# Patient Record
Sex: FEMALE | Race: WHITE | NOT HISPANIC OR LATINO
[De-identification: names, ages, dates, MRNs, and addresses within clinical notes are randomized per-mention and may not be internally consistent; named-entity substitution may affect disease eponyms.]

---

## 2019-08-23 ENCOUNTER — APPOINTMENT (OUTPATIENT)
Dept: GERIATRICS | Facility: CLINIC | Age: 84
End: 2019-08-23
Payer: MEDICARE

## 2019-08-23 VITALS
HEIGHT: 58 IN | DIASTOLIC BLOOD PRESSURE: 80 MMHG | RESPIRATION RATE: 17 BRPM | SYSTOLIC BLOOD PRESSURE: 118 MMHG | HEART RATE: 69 BPM | WEIGHT: 157.2 LBS | BODY MASS INDEX: 33 KG/M2 | TEMPERATURE: 98.5 F | OXYGEN SATURATION: 95 %

## 2019-08-23 DIAGNOSIS — I05.9 RHEUMATIC MITRAL VALVE DISEASE, UNSPECIFIED: ICD-10-CM

## 2019-08-23 DIAGNOSIS — I07.9 RHEUMATIC TRICUSPID VALVE DISEASE, UNSPECIFIED: ICD-10-CM

## 2019-08-23 DIAGNOSIS — R60.9 EDEMA, UNSPECIFIED: ICD-10-CM

## 2019-08-23 DIAGNOSIS — Z87.440 PERSONAL HISTORY OF URINARY (TRACT) INFECTIONS: ICD-10-CM

## 2019-08-23 DIAGNOSIS — R91.8 OTHER NONSPECIFIC ABNORMAL FINDING OF LUNG FIELD: ICD-10-CM

## 2019-08-23 DIAGNOSIS — Z71.89 OTHER SPECIFIED COUNSELING: ICD-10-CM

## 2019-08-23 DIAGNOSIS — I10 ESSENTIAL (PRIMARY) HYPERTENSION: ICD-10-CM

## 2019-08-23 DIAGNOSIS — M10.9 GOUT, UNSPECIFIED: ICD-10-CM

## 2019-08-23 DIAGNOSIS — F41.9 ANXIETY DISORDER, UNSPECIFIED: ICD-10-CM

## 2019-08-23 DIAGNOSIS — R07.9 CHEST PAIN, UNSPECIFIED: ICD-10-CM

## 2019-08-23 DIAGNOSIS — M79.2 NEURALGIA AND NEURITIS, UNSPECIFIED: ICD-10-CM

## 2019-08-23 DIAGNOSIS — E78.5 HYPERLIPIDEMIA, UNSPECIFIED: ICD-10-CM

## 2019-08-23 PROCEDURE — 99205 OFFICE O/P NEW HI 60 MIN: CPT | Mod: GC

## 2019-08-23 RX ORDER — ELECTROLYTES/DEXTROSE
SOLUTION, ORAL ORAL DAILY
Refills: 0 | Status: ACTIVE | COMMUNITY
Start: 2019-08-23

## 2019-08-23 RX ORDER — HYDROCHLOROTHIAZIDE 25 MG/1
25 TABLET ORAL DAILY
Refills: 0 | Status: ACTIVE | COMMUNITY
Start: 2019-08-23

## 2019-08-23 RX ORDER — ROSUVASTATIN CALCIUM 10 MG/1
10 TABLET, FILM COATED ORAL DAILY
Refills: 0 | Status: ACTIVE | COMMUNITY
Start: 2019-08-23

## 2019-08-23 RX ORDER — GABAPENTIN 100 MG/1
100 CAPSULE ORAL 3 TIMES DAILY
Qty: 90 | Refills: 3 | Status: ACTIVE | COMMUNITY
Start: 2019-08-23

## 2019-08-23 RX ORDER — OMEPRAZOLE 40 MG/1
40 CAPSULE, DELAYED RELEASE ORAL DAILY
Refills: 0 | Status: ACTIVE | COMMUNITY
Start: 2019-08-23

## 2019-08-23 RX ORDER — ALLOPURINOL 100 MG/1
100 TABLET ORAL DAILY
Refills: 0 | Status: ACTIVE | COMMUNITY
Start: 2019-08-23

## 2019-08-23 NOTE — PHYSICAL EXAM
[General Appearance - Alert] : alert [General Appearance - In No Acute Distress] : in no acute distress [General Appearance - Well Nourished] : well nourished [General Appearance - Well Developed] : well developed [PERRL With Normal Accommodation] : pupils were equal in size, round, and reactive to light [Sclera] : the sclera and conjunctiva were normal [Extraocular Movements] : extraocular movements were intact [Normal Oral Mucosa] : normal oral mucosa [Neck Appearance] : the appearance of the neck was normal [Jugular Venous Distention Increased] : there was no jugular-venous distention [Apical Impulse] : the apical impulse was normal [Heart Rate And Rhythm] : heart rate was normal and rhythm regular [Heart Sounds] : normal S1 and S2 [Abdomen Soft] : soft [Bowel Sounds] : normal bowel sounds [Abdomen Tenderness] : non-tender [No CVA Tenderness] : no ~M costovertebral angle tenderness [No Spinal Tenderness] : no spinal tenderness [Abnormal Walk] : normal gait [Musculoskeletal - Swelling] : no joint swelling seen [Nail Clubbing] : no clubbing  or cyanosis of the fingernails [Skin Color & Pigmentation] : normal skin color and pigmentation [Skin Turgor] : normal skin turgor [Deep Tendon Reflexes (DTR)] : deep tendon reflexes were 2+ and symmetric [] : no rash [Cranial Nerves] : cranial nerves 2-12 were intact [Oriented To Time, Place, And Person] : oriented to person, place, and time [Affect] : the affect was normal [Mood] : the mood was normal [FreeTextEntry1] : scoliosis

## 2019-08-23 NOTE — ASSESSMENT
[FreeTextEntry1] : Pt had recent blood work done, will not repeat\par f/u visit in 3 month and will plan for Influenza shot on next visit.

## 2019-08-23 NOTE — REVIEW OF SYSTEMS
[Limb Pain] : limb pain [Negative] : Psychiatric [FreeTextEntry8] : vaginal discomfort  [FreeTextEntry9] : left leg pain and right shoulder pain

## 2019-08-23 NOTE — END OF VISIT
[] : Fellow [FreeTextEntry3] : 89yo woman who is brought by her daughter Sailaja Haas ( [phone]) for primary care. Patient resides at the [facility] in [place]  for the past 4 years and is stable. PMH: Breast, lung cancer (f/u in [facility]), osteoarthritis, dextroscoliosis, gallstones, cystocele, CLL and glaucoma. Major complain left leg since August 3rd, no trauma, worse upon ambulation. She also has recurrent UTIs, is now completing a course of Cipro.. Pt has also noted vaginal discomfort over the past day, reported has not seen a GYN for the past 5 years. \par Was previously on medical cannabis for pain, but discontinued 2 weeks as she did not notice any benefits. Pt was seen by a Physiatrist Dr. Emerson in [facility] was started on acupuncture, medical cannabis and tramadol (but never took it). Pt had recent MRI showing dextroscoliosis and is scheduled for epidural injection of her back. \par PE: Marked kyphoscoliosis, poor lung expansion, heart RSR, gait very unsteady, difficulty getting up from chair. \par Plan: refer to urogynecology from comprehensive gyn exam , r/o vaginal atrophy. Elective injection L4-L5 .\par \par Patient does not drive. Pt sets up a pill box to keep track of medication daily.

## 2019-08-29 ENCOUNTER — TRANSCRIPTION ENCOUNTER (OUTPATIENT)
Age: 84
End: 2019-08-29

## 2019-08-29 ENCOUNTER — APPOINTMENT (OUTPATIENT)
Dept: UROGYNECOLOGY | Facility: CLINIC | Age: 84
End: 2019-08-29
Payer: MEDICARE

## 2019-08-29 VITALS
HEART RATE: 87 BPM | DIASTOLIC BLOOD PRESSURE: 81 MMHG | SYSTOLIC BLOOD PRESSURE: 133 MMHG | BODY MASS INDEX: 32.95 KG/M2 | WEIGHT: 157 LBS | HEIGHT: 58 IN

## 2019-08-29 DIAGNOSIS — Z83.511 FAMILY HISTORY OF GLAUCOMA: ICD-10-CM

## 2019-08-29 DIAGNOSIS — N39.0 URINARY TRACT INFECTION, SITE NOT SPECIFIED: ICD-10-CM

## 2019-08-29 DIAGNOSIS — Z80.0 FAMILY HISTORY OF MALIGNANT NEOPLASM OF DIGESTIVE ORGANS: ICD-10-CM

## 2019-08-29 DIAGNOSIS — Z86.69 PERSONAL HISTORY OF OTHER DISEASES OF THE NERVOUS SYSTEM AND SENSE ORGANS: ICD-10-CM

## 2019-08-29 DIAGNOSIS — C91.90 LYMPHOID LEUKEMIA, UNSPECIFIED NOT HAVING ACHIEVED REMISSION: ICD-10-CM

## 2019-08-29 DIAGNOSIS — Z81.8 FAMILY HISTORY OF OTHER MENTAL AND BEHAVIORAL DISORDERS: ICD-10-CM

## 2019-08-29 DIAGNOSIS — N95.2 POSTMENOPAUSAL ATROPHIC VAGINITIS: ICD-10-CM

## 2019-08-29 LAB
BILIRUB UR QL STRIP: NORMAL
CLARITY UR: CLEAR
COLLECTION METHOD: NORMAL
GLUCOSE UR-MCNC: NORMAL
HCG UR QL: 0.2 EU/DL
HGB UR QL STRIP.AUTO: NORMAL
KETONES UR-MCNC: NORMAL
LEUKOCYTE ESTERASE UR QL STRIP: NORMAL
NITRITE UR QL STRIP: NORMAL
PH UR STRIP: 7.5
PROT UR STRIP-MCNC: NORMAL
SP GR UR STRIP: 1.01

## 2019-08-29 PROCEDURE — 81003 URINALYSIS AUTO W/O SCOPE: CPT | Mod: NC,QW

## 2019-08-29 PROCEDURE — 99204 OFFICE O/P NEW MOD 45 MIN: CPT

## 2019-08-29 RX ORDER — LATANOPROST/PF 0.005 %
DROPS OPHTHALMIC (EYE)
Refills: 0 | Status: ACTIVE | COMMUNITY

## 2019-08-29 RX ORDER — PILOCARPINE HYDROCHLORIDE 7.5 MG/1
TABLET, FILM COATED ORAL
Refills: 0 | Status: ACTIVE | COMMUNITY

## 2019-08-29 NOTE — HISTORY OF PRESENT ILLNESS
[Cystocele (Obstetric)] : none [Rectal Prolapse] : none [Vaginal Wall Prolapse] : none [Feelings Of Urinary Urgency] : none [Urinary Frequency] : none [x1] : once nightly [Urinary Tract Infection] : none [Constipation Obstructed Defecation] : frequent [] : years ago [Pelvic Pain] : none [Vaginal Pain] : none [Unable To Restrain Bowel Movement] : frequent [de-identified] : Had an episode in 8/24/19 where she could not urinate for several hours. She went to ER and once she got there she was able to urinate without difficulty. Family attributes it to anxiety.  [de-identified] : not sexually active [FreeTextEntry6] : taking prunes and magnesium supplement [FreeTextEntry1] : \par 89yo presents with her daughter for frequent UTI's starting in June. She went to urgent care center June, July and August and is currently being treated for UTI with Ciprofloxacin. She describes her symptoms as dizziness, lightheadedness and her daughter reports she also experiences altered mental status. She denies dysuria, urgency, frequency or hematuria.  She reports AISHA, but denies urgency or frequency. She currently resides in an assisted living facility and has been there for the past 4 years.  She is awaked by assisted living staff at night to use the bathroom at least once at night though she denies feeling urgency to go. \par \par PMH: h/p breast cancer (2000 and 2018) - breast cancer from 2000 had radiation and chemotherapy, h/o lung cancer, HTN, glaucoma, CLL\par PSH: left mastectomy (2000), right mastectomy (2016), lung resection, abdominal supracervical hysterectomy \par All: Sulfa drugs \par

## 2019-08-29 NOTE — DISCUSSION/SUMMARY
[FreeTextEntry1] : Patient counseling occurred with daughter present. We discussed her reports of having UTI's and reviewed the symptoms that are specific for UTI's. She will need to fax records from urgent care center to our office. She was counseled on ways to prevent UTI's including vaginal estrogen, hydration, and cranberry supplements. Atrophy is a significant finding on the patient exam.  Options for symptomatic relief were reviewed.  The risks, benefits, the relationship and date relating to estrogens and breast cancer were reviewed. Secondary to patient's history of breast cancer she would prefer not to use vaginal estrogen. She was counseled that if she experiences dysuria, urgency or frequency, to come to our office to have a catheterized specimen as it is unlikely that she is able to give a clean catch if she were to go to an urgent care center. Should she go to an urgent care center, she will need to request a catheterized specimen. All questions and concerns were answered. She will follow up in 2 months. \par

## 2019-08-29 NOTE — PHYSICAL EXAM
[Well developed] : well developed [No Acute Distress] : in no acute distress [Well Nourished] : ~L well nourished [Bulbocavernous] : bulbocavernous was present [Oriented x3] : oriented to person, place, and time [Anal Reflex] : the anal reflex was present [Warm and Dry] : was warm and dry to touch [Labia Majora] : were normal [Normal Appearance] : general appearance was normal [Atrophy] : atrophy [Absent] : absent [Post Void Residual ____ml] : post void residual via catheterization was [unfilled] ml [Normal rectal exam] : was normal [Vulvar Atrophy] : vulvar atrophy [Normal] : normal [Anxiety] : patient is not anxious [Tenderness] : ~T no ~M abdominal tenderness observed [Distended] : not distended [H/Smegaly] : no hepatosplenomegaly [Inguinal LAD] : no adenopathy was noted in the inguinal lymph nodes [Normal Gait] : gait was abnormal [Uterine Adnexae] : were not tender and not enlarged [de-identified] : No prolapse

## 2019-08-29 NOTE — OB HISTORY
[Total Preg ___] : : [unfilled] [Full Term ___] : [unfilled] (full-term) [Vaginal ___] : [unfilled] vaginal delivery(s) [Last Pap Smear ___] : date of last pap smear was on [unfilled] [Abnormal Pap Smear] : normal pap smear [Sexually Active] : is not sexually active

## 2019-10-02 ENCOUNTER — APPOINTMENT (OUTPATIENT)
Dept: UROGYNECOLOGY | Facility: CLINIC | Age: 84
End: 2019-10-02
Payer: MEDICARE

## 2019-10-02 DIAGNOSIS — R39.11 HESITANCY OF MICTURITION: ICD-10-CM

## 2019-10-02 LAB
BILIRUB UR QL STRIP: NORMAL
CLARITY UR: CLEAR
COLLECTION METHOD: NORMAL
GLUCOSE UR-MCNC: NORMAL
HCG UR QL: 0.2 EU/DL
HGB UR QL STRIP.AUTO: NORMAL
KETONES UR-MCNC: NORMAL
LEUKOCYTE ESTERASE UR QL STRIP: NORMAL
NITRITE UR QL STRIP: NORMAL
PH UR STRIP: 7
PROT UR STRIP-MCNC: NORMAL
SP GR UR STRIP: 1.01

## 2019-10-02 PROCEDURE — 81003 URINALYSIS AUTO W/O SCOPE: CPT | Mod: NC,GC,QW

## 2019-10-02 PROCEDURE — 99213 OFFICE O/P EST LOW 20 MIN: CPT | Mod: GC

## 2019-10-02 NOTE — HISTORY OF PRESENT ILLNESS
[Feelings Of Urinary Urgency] : none [Hematuria] : none [Urinary Frequency] : daily [Urinary Tract Infection] : none [de-identified] : Dribbling  [FreeTextEntry1] : \par Patient presented as a walk-in due to difficulty voiding. Upon arrival, patient states she was able to void. She has a history of chronic lower back pain, s/p 2 epidurals within the last few weeks. Denies current dysuria, hematuria, urgency, frequency. Patient states that at her visit with pain management on Friday, she was asked if she had any trouble urinating or defecating. Since then she feels like she is only "dribbling" when voiding and has the sensation of incomplete emptying. Patient states she does not think it is a UTI, but was not able to verbalize what symptoms she usually has with a UTI. \par \par Drinks 2.5 bottles of water.

## 2019-10-02 NOTE — DISCUSSION/SUMMARY
[FreeTextEntry1] : \par 89yo with anxiety presented for urinary retention, now able to void normally. Discussed with patient the effect of anxiety on pelvic floor dysfunction. Recommended relaxation techniques, increasing fluid intake, lean forward, turning on water to trigger urination. Previously declined vaginal estrogen due to personal history of breast cancer. \par \par Recent lumbar MRI (for chronic back pain) showed bilateral renal cysts. Reassurance provided. \par \par Urine dip today showed small blood. Will send for UA C&S \par \par RTO in 1 month.

## 2019-10-02 NOTE — PHYSICAL EXAM
[Well developed] : well developed [Well Nourished] : ~L well nourished [Oriented x3] : oriented to person, place, and time [Normal Memory] : ~T memory was ~L unimpaired [Normal Mood/Affect] : mood and affect are normal [Anxiety] : patient is anxious [Normal Appearance] : ~T the appearance of the neck was normal [Warm and Dry] : was warm and dry to touch [Normal Gait] : gait was normal [Labia Majora] : were normal [Labia Minora] : were normal [Normal] : was normal [No Acute Distress] : in acute distress [Tenderness] : ~T no ~M abdominal tenderness observed [Distended] : not distended

## 2019-10-04 ENCOUNTER — RESULT REVIEW (OUTPATIENT)
Age: 84
End: 2019-10-04

## 2019-11-11 ENCOUNTER — APPOINTMENT (OUTPATIENT)
Dept: UROGYNECOLOGY | Facility: CLINIC | Age: 84
End: 2019-11-11

## 2019-11-26 LAB
APPEARANCE: CLEAR
BACTERIA UR CULT: NORMAL
BACTERIA: NEGATIVE
BILIRUBIN URINE: NEGATIVE
BLOOD URINE: ABNORMAL
COLOR: COLORLESS
GLUCOSE QUALITATIVE U: NEGATIVE
HYALINE CASTS: 0 /LPF
KETONES URINE: NEGATIVE
LEUKOCYTE ESTERASE URINE: NEGATIVE
MICROSCOPIC-UA: NORMAL
NITRITE URINE: NEGATIVE
PH URINE: 8
PROTEIN URINE: NEGATIVE
RED BLOOD CELLS URINE: 2 /HPF
SPECIFIC GRAVITY URINE: 1.01
SQUAMOUS EPITHELIAL CELLS: 1 /HPF
UROBILINOGEN URINE: NORMAL
WHITE BLOOD CELLS URINE: 1 /HPF

## 2019-12-20 ENCOUNTER — APPOINTMENT (OUTPATIENT)
Dept: GERIATRICS | Facility: CLINIC | Age: 84
End: 2019-12-20

## 2020-12-21 PROBLEM — Z87.440 HISTORY OF URINARY TRACT INFECTION: Status: RESOLVED | Noted: 2019-08-23 | Resolved: 2020-12-21
